# Patient Record
(demographics unavailable — no encounter records)

---

## 2024-10-09 NOTE — PHYSICAL EXAM
[Normal Conjunctiva] : the conjunctiva exhibited no abnormalities [Heart Rate And Rhythm] : heart rate and rhythm were normal [Heart Sounds] : normal S1 and S2 [Murmurs] : no murmurs present [Arterial Pulses Normal] : the arterial pulses were normal [Bowel Sounds] : normal bowel sounds [Abdomen Soft] : soft [Abdomen Tenderness] : non-tender [Abnormal Walk] : normal gait [Cyanosis, Localized] : no localized cyanosis [] : no rash [Affect] : the affect was normal [FreeTextEntry1] : pleasant

## 2024-10-09 NOTE — HISTORY OF PRESENT ILLNESS
[FreeTextEntry1] : 87-year-old man routine follow-up for atherosclerotic heart disease hyperlipidemia hypertension and valvular heart disease  "I feel okay.  "Octavio has made a good recovery following a 5/24 hospitalization for mechanical fall resulting in a pubic fracture.  Nonsurgical treatment was advised.  He denies symptoms of chest pain, palpitation shortness of breath or syncope.

## 2024-10-09 NOTE — DISCUSSION/SUMMARY
[FreeTextEntry1] : Atherosclerotic heart disease Mr. Padgett has known atherosclerotic heart disease. In   Octavio  had an acute inferior wall myocardial infarction/STEMI. A proximal 75% RCA stenosis was addressed by [facility]. The LAD had a 40% proximal stenosis. There was a 30% ostial left circumflex stenosis and the OM 1  had a 30% lesion. A  stress sestamibi study was normal.   The     Lexiscan sesta mibi study was normal. Left ventricular systolic function is normal as reflected by a left ventricular ejection fraction of 75% on a   left ventriculogram ,  69 % on the   echocardiogram  and 65% on the  gaited sesta mibi study. In view of the lack of angina, above noninvasive studies and clinical course continued medical management is indicated at this time..   I have recommended the followin risk factor modification 2 continue the present medical regimen 3 No further cardiac testing for this problem at this time     Hypertension Hypertension has been controlled on the  present medical regimen.   .In the setting of atherosclerotic heart disease and previous myocardial  infarction beta blocker and Ace-I/ARB therapy are attractive.  I have recommended the followin low salt low fat low cholesterol diet. Regular aerobic exercise 2 continue the present medical regimen 3 addition of ACE-I/ARB therapy if required to maintain optimal levels as discussed above    Valvular heart disease / Mitral/Aortic regurgitation The   echocardiogram revealed mild mitral/aortic insufficiency and mild left ventricular hypertrophy.. Mild pulmonary hypertension was noted with a pulmonary artery systolic pressure of 37 mmHg. The left ventricular ejection fraction was 69%. The present cardiac physical examination is not suggestive of severe mitral/aortic regurgitation. In view of the lack of symptoms, absence of heart failure  and clinical course continued monitoring without intervention is indicated at this time.  I have recommended the followin No further cardiac testing for this problem at this time 2.Echocardiogram with next office evaluation    Hyperlipidemia Hyperlipidemia represents a risk factor for progressive atherosclerotic disease. The target LDL level with known atherosclerotic heart disease about 70. HMG coA reductase inhibitor therapy has been effective In  the serum cholesterol level was 115 HDL 34 LDL 68 and triglycerides 93 . More recent lipid levels are not available for review. The dose of atorvastatin may be increased if required to maintain optimal levels.Nonpharmacological therapy, specifically diet and exercise are emphasized  as  major aspects of treatment.   I have recommended the followin  low-salt low-fat low-cholesterol diet. Regular aerobic exercise 2 continue the present medical regimen 3 target LDL level to about 70 as discussed above. 4 Laboratory studies through primary care Dr. Lowery to include fasting lipid profile 5 increase atorvastatin dose if required to maintain  optimal levels as noted above    The diagnosis, prognosis, risks, options and alternatives were explained at length to the patient   . All questions were answered. Issues discussed included atherosclerotic heart disease hypertension  hyperlipidemia non invasive cardiac testing diet and exercise..   Counseling and/or coordination of care Time was a significant factor for this patient encounter. Total time spent with the patient was  25   minutes. 50% of the time was devoted to counseling and/or coordination of care.

## 2025-06-09 NOTE — PHYSICAL EXAM
[Well Developed] : well developed [Well Nourished] : well nourished [No Acute Distress] : no acute distress [Normal Conjunctiva] : normal conjunctiva [No Carotid Bruit] : no carotid bruit [Normal Venous Pressure] : normal venous pressure [Normal S1, S2] : normal S1, S2 [No Gallop] : no gallop [No Murmur] : no murmur [No Rub] : no rub [Clear Lung Fields] : clear lung fields [Good Air Entry] : good air entry [No Respiratory Distress] : no respiratory distress  [Soft] : abdomen soft [No Masses/organomegaly] : no masses/organomegaly [Non Tender] : non-tender [Normal Bowel Sounds] : normal bowel sounds [Normal Gait] : normal gait [No Edema] : no edema [No Cyanosis] : no cyanosis [No Clubbing] : no clubbing [No Varicosities] : no varicosities [Edema ___] : edema [unfilled] [No Rash] : no rash [Venous stasis] : venous stasis [No Skin Lesions] : no skin lesions [Moves all extremities] : moves all extremities [No Focal Deficits] : no focal deficits [Normal Speech] : normal speech [Alert and Oriented] : alert and oriented [Normal memory] : normal memory

## 2025-06-09 NOTE — HISTORY OF PRESENT ILLNESS
[FreeTextEntry1] : Patient reports doing well, denying chest pain, shortness of breath, palpitations  He gets dizzy when he first wakes up in the morning but that quickly improves  He does have some dependent lower extremity edema but denies PND orthopnea  Chart reviewed

## 2025-06-09 NOTE — REASON FOR VISIT
[Hyperlipidemia] : hyperlipidemia [Hypertension] : hypertension [FreeTextEntry3] : Dr Lowery [Coronary Artery Disease] : coronary artery disease